# Patient Record
Sex: FEMALE | Race: WHITE
[De-identification: names, ages, dates, MRNs, and addresses within clinical notes are randomized per-mention and may not be internally consistent; named-entity substitution may affect disease eponyms.]

---

## 2017-03-27 ENCOUNTER — HOSPITAL ENCOUNTER (OUTPATIENT)
Dept: HOSPITAL 45 - C.MAMM | Age: 57
Discharge: HOME | End: 2017-03-27
Attending: FAMILY MEDICINE
Payer: COMMERCIAL

## 2017-03-27 DIAGNOSIS — Z12.31: Primary | ICD-10-CM

## 2017-03-28 NOTE — MAMMOGRAPHY REPORT
BILATERAL DIGITAL SCREENING MAMMOGRAM TOMOSYNTHESIS WITH CAD: 3/27/2017

CLINICAL HISTORY: Routine screening.  Patient has no complaints.  





TECHNIQUE:  Breast tomosynthesis in addition to standard 2D mammography was performed. Current study
 was also evaluated with a Computer Aided Detection (CAD) system.  



COMPARISON: Comparison is made to exams dated:  3/24/2016 mammogram, 2/27/2015 mammogram, 2/27/2015 
ultrasound - [facility], and 12/14/2007.   



BREAST COMPOSITION:  The tissue of both breasts is heterogeneously dense, which may obscure small ma
sses.  



FINDINGS: There are benign-appearing microcalcifications scattered in the breasts.  No irregular or 
spiculated mass, architectural distortion or cluster of suspicious microcalcifications is seen.  



IMPRESSION:  ACR BI-RADS CATEGORY 1: NEGATIVE

There is no mammographic evidence of malignancy. A 1 year screening mammogram is recommended.  The p
atient will receive written notification of the results.  





Approximately 10% of breast cancers are not detected with mammography. A negative mammographic repor
t should not delay biopsy if a clinically suggestive mass is present.



Erica Baugh M.D.          

ay/:3/27/2017 16:54:22  



Imaging Technologist: Celia NAVARRO(JACQUES)(JOSE), [facility]

letter sent: Normal 1/2  

BI-RADS Code: ACR BI-RADS Category 1: Negative

## 2017-10-09 ENCOUNTER — HOSPITAL ENCOUNTER (OUTPATIENT)
Dept: HOSPITAL 45 - C.RAD1850 | Age: 57
Discharge: HOME | End: 2017-10-09
Attending: NURSE PRACTITIONER
Payer: COMMERCIAL
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
## 2017-10-09 DIAGNOSIS — M79.641: Primary | ICD-10-CM

## 2017-10-09 DIAGNOSIS — M25.531: ICD-10-CM

## 2017-10-09 NOTE — DIAGNOSTIC IMAGING REPORT
RIGHT HAND 3 VIEWS



HISTORY:      Right hand pain.



COMPARISON: None.



FINDINGS: There is no fracture or dislocation. Mild soft tissue swelling at the

base of the index finger. No radiopaque foreign bodies.



IMPRESSION:  

No fractures within the right hand.







Electronically signed by:  Jaime Ohara M.D.

10/9/2017 4:33 PM



Dictated Date/Time:  10/9/2017 4:31 PM

## 2017-10-09 NOTE — DIAGNOSTIC IMAGING REPORT
R WRIST W/NAVICULAR MIN 3 VIEWS



CLINICAL HISTORY: 57 years-old Female presenting with FALL. 



TECHNIQUE: Frontal, bilateral oblique, lateral, and scaphoid views the right

wrist were obtained.



COMPARISON:  None.



FINDINGS:

No acute fracture or malalignment. Specifically, the scaphoid is normal

appearing. Osteopenia may be present. No significant degenerative change. No

radiographic soft tissue abnormality.



IMPRESSION:

No acute osseous injury of the right wrist. If there is continuing clinical

concern for scaphoid fracture, follow-up radiographs in one week versus

noncontrast MR could be obtained.







Electronically signed by:  Tr Barrera M.D.

10/9/2017 4:26 PM



Dictated Date/Time:  10/9/2017 4:25 PM

## 2018-03-28 ENCOUNTER — HOSPITAL ENCOUNTER (OUTPATIENT)
Dept: HOSPITAL 45 - C.MAMM | Age: 58
Discharge: HOME | End: 2018-03-28
Attending: FAMILY MEDICINE
Payer: COMMERCIAL

## 2018-03-28 DIAGNOSIS — Z12.31: Primary | ICD-10-CM

## 2018-03-29 NOTE — MAMMOGRAPHY REPORT
BILATERAL DIGITAL SCREENING MAMMOGRAM TOMOSYNTHESIS WITH CAD: 3/28/2018

CLINICAL HISTORY: Routine screening.  





TECHNIQUE:  Breast tomosynthesis in addition to standard 2D mammography was performed. Current study 
was also evaluated with a Computer Aided Detection (CAD) system.  



COMPARISON: Comparison is made to exams dated:  3/27/2017 mammogram, 3/24/2016 mammogram, 2/27/2015 u
ltrasound, 2/27/2015 mammogram - [facility], and 12/14/2007.   



BREAST COMPOSITION:  The tissue of both breasts is heterogeneously dense, which may obscure small mas
ses.  



FINDINGS:  No suspicious masses, calcifications, or areas of architectural distortion are noted in ei
ther breast. There has been no significant interval change compared to prior exams. Scattered bilater
al benign-appearing calcifications are not significantly changed.  Asymmetry in the left superior deana
ast on the MLO view has the appearance of normal overlapping fibroglandular tissue on the tomosynthes
is images.



IMPRESSION:  ACR BI-RADS CATEGORY 2: BENIGN

There is no mammographic evidence of malignancy. A 1 year screening mammogram is recommended.  The pa
tient will receive written notification of the results.  





Approximately 10% of breast cancers are not detected with mammography. A negative mammographic report
 should not delay biopsy if a clinically suggestive mass is present.



Aniyah García M.D.          

/:3/28/2018 16:20:43  



Imaging Technologist: Celia MOLINA)(JOSE), [facility]

letter sent: Normal 1/2  

BI-RADS Code: ACR BI-RADS Category 2: Benign